# Patient Record
Sex: FEMALE | Race: OTHER | URBAN - METROPOLITAN AREA
[De-identification: names, ages, dates, MRNs, and addresses within clinical notes are randomized per-mention and may not be internally consistent; named-entity substitution may affect disease eponyms.]

---

## 2023-06-09 RX ORDER — PROGESTERONE 100 MG/1
300 CAPSULE ORAL NIGHTLY
COMMUNITY

## 2023-06-16 ENCOUNTER — ANESTHESIA EVENT (OUTPATIENT)
Dept: SURGERY | Facility: HOSPITAL | Age: 33
End: 2023-06-16
Payer: COMMERCIAL

## 2023-06-16 ENCOUNTER — HOSPITAL ENCOUNTER (OUTPATIENT)
Facility: HOSPITAL | Age: 33
Setting detail: HOSPITAL OUTPATIENT SURGERY
Discharge: HOME | End: 2023-06-16
Attending: OBSTETRICS & GYNECOLOGY | Admitting: OBSTETRICS & GYNECOLOGY
Payer: COMMERCIAL

## 2023-06-16 ENCOUNTER — HOSPITAL ENCOUNTER (OUTPATIENT)
Dept: ULTRASOUND IMAGING | Facility: HOSPITAL | Age: 33
Discharge: HOME | End: 2023-06-16
Attending: OBSTETRICS & GYNECOLOGY | Admitting: OBSTETRICS & GYNECOLOGY
Payer: COMMERCIAL

## 2023-06-16 ENCOUNTER — ANESTHESIA (OUTPATIENT)
Dept: SURGERY | Facility: HOSPITAL | Age: 33
End: 2023-06-16
Payer: COMMERCIAL

## 2023-06-16 VITALS
OXYGEN SATURATION: 100 % | DIASTOLIC BLOOD PRESSURE: 95 MMHG | BODY MASS INDEX: 32.44 KG/M2 | HEART RATE: 66 BPM | RESPIRATION RATE: 18 BRPM | HEIGHT: 64 IN | TEMPERATURE: 98 F | WEIGHT: 190 LBS | SYSTOLIC BLOOD PRESSURE: 134 MMHG

## 2023-06-16 DIAGNOSIS — O02.1 EMBRYONIC DEMISE: ICD-10-CM

## 2023-06-16 DIAGNOSIS — O02.89 NONVIABLE PREGNANCY: ICD-10-CM

## 2023-06-16 PROCEDURE — 88305 TISSUE EXAM BY PATHOLOGIST: CPT | Performed by: OBSTETRICS & GYNECOLOGY

## 2023-06-16 PROCEDURE — 88291 CYTO/MOLECULAR REPORT: CPT | Performed by: OBSTETRICS & GYNECOLOGY

## 2023-06-16 PROCEDURE — 88342 IMHCHEM/IMCYTCHM 1ST ANTB: CPT | Performed by: OBSTETRICS & GYNECOLOGY

## 2023-06-16 PROCEDURE — 88271 CYTOGENETICS DNA PROBE: CPT | Performed by: OBSTETRICS & GYNECOLOGY

## 2023-06-16 PROCEDURE — 76998 US GUIDE INTRAOP: CPT | Performed by: OBSTETRICS & GYNECOLOGY

## 2023-06-16 PROCEDURE — 10D17ZZ EXTRACTION OF PRODUCTS OF CONCEPTION, RETAINED, VIA NATURAL OR ARTIFICIAL OPENING: ICD-10-PCS | Performed by: OBSTETRICS & GYNECOLOGY

## 2023-06-16 RX ORDER — MEPERIDINE HYDROCHLORIDE 25 MG/ML
12.5 INJECTION INTRAMUSCULAR; INTRAVENOUS; SUBCUTANEOUS AS NEEDED
Status: DISCONTINUED | OUTPATIENT
Start: 2023-06-16 | End: 2023-06-16

## 2023-06-16 RX ORDER — HYDROCODONE BITARTRATE AND ACETAMINOPHEN 5; 325 MG/1; MG/1
2 TABLET ORAL ONCE AS NEEDED
Status: DISCONTINUED | OUTPATIENT
Start: 2023-06-16 | End: 2023-06-16

## 2023-06-16 RX ORDER — ACETAMINOPHEN 500 MG
1000 TABLET ORAL ONCE AS NEEDED
Status: DISCONTINUED | OUTPATIENT
Start: 2023-06-16 | End: 2023-06-16

## 2023-06-16 RX ORDER — PROCHLORPERAZINE EDISYLATE 5 MG/ML
5 INJECTION INTRAMUSCULAR; INTRAVENOUS EVERY 8 HOURS PRN
Status: DISCONTINUED | OUTPATIENT
Start: 2023-06-16 | End: 2023-06-16

## 2023-06-16 RX ORDER — HYDROMORPHONE HYDROCHLORIDE 1 MG/ML
0.4 INJECTION, SOLUTION INTRAMUSCULAR; INTRAVENOUS; SUBCUTANEOUS EVERY 5 MIN PRN
Status: DISCONTINUED | OUTPATIENT
Start: 2023-06-16 | End: 2023-06-16

## 2023-06-16 RX ORDER — ACETAMINOPHEN 500 MG
1000 TABLET ORAL ONCE
Status: DISCONTINUED | OUTPATIENT
Start: 2023-06-16 | End: 2023-06-16 | Stop reason: HOSPADM

## 2023-06-16 RX ORDER — KETOROLAC TROMETHAMINE 30 MG/ML
INJECTION, SOLUTION INTRAMUSCULAR; INTRAVENOUS AS NEEDED
Status: DISCONTINUED | OUTPATIENT
Start: 2023-06-16 | End: 2023-06-16 | Stop reason: SURG

## 2023-06-16 RX ORDER — SODIUM CHLORIDE, SODIUM LACTATE, POTASSIUM CHLORIDE, CALCIUM CHLORIDE 600; 310; 30; 20 MG/100ML; MG/100ML; MG/100ML; MG/100ML
INJECTION, SOLUTION INTRAVENOUS CONTINUOUS
Status: DISCONTINUED | OUTPATIENT
Start: 2023-06-16 | End: 2023-06-16

## 2023-06-16 RX ORDER — LIDOCAINE HYDROCHLORIDE 10 MG/ML
INJECTION, SOLUTION EPIDURAL; INFILTRATION; INTRACAUDAL; PERINEURAL AS NEEDED
Status: DISCONTINUED | OUTPATIENT
Start: 2023-06-16 | End: 2023-06-16 | Stop reason: SURG

## 2023-06-16 RX ORDER — HYDROMORPHONE HYDROCHLORIDE 1 MG/ML
0.6 INJECTION, SOLUTION INTRAMUSCULAR; INTRAVENOUS; SUBCUTANEOUS EVERY 5 MIN PRN
Status: DISCONTINUED | OUTPATIENT
Start: 2023-06-16 | End: 2023-06-16

## 2023-06-16 RX ORDER — LABETALOL HYDROCHLORIDE 5 MG/ML
5 INJECTION, SOLUTION INTRAVENOUS EVERY 5 MIN PRN
Status: DISCONTINUED | OUTPATIENT
Start: 2023-06-16 | End: 2023-06-16

## 2023-06-16 RX ORDER — MIDAZOLAM HYDROCHLORIDE 1 MG/ML
1 INJECTION INTRAMUSCULAR; INTRAVENOUS EVERY 5 MIN PRN
Status: DISCONTINUED | OUTPATIENT
Start: 2023-06-16 | End: 2023-06-16

## 2023-06-16 RX ORDER — MIDAZOLAM HYDROCHLORIDE 1 MG/ML
INJECTION INTRAMUSCULAR; INTRAVENOUS AS NEEDED
Status: DISCONTINUED | OUTPATIENT
Start: 2023-06-16 | End: 2023-06-16 | Stop reason: SURG

## 2023-06-16 RX ORDER — SCOLOPAMINE TRANSDERMAL SYSTEM 1 MG/1
1 PATCH, EXTENDED RELEASE TRANSDERMAL ONCE
Status: DISCONTINUED | OUTPATIENT
Start: 2023-06-16 | End: 2023-06-16 | Stop reason: HOSPADM

## 2023-06-16 RX ORDER — HYDROMORPHONE HYDROCHLORIDE 1 MG/ML
0.2 INJECTION, SOLUTION INTRAMUSCULAR; INTRAVENOUS; SUBCUTANEOUS EVERY 5 MIN PRN
Status: DISCONTINUED | OUTPATIENT
Start: 2023-06-16 | End: 2023-06-16

## 2023-06-16 RX ORDER — ONDANSETRON 2 MG/ML
4 INJECTION INTRAMUSCULAR; INTRAVENOUS EVERY 6 HOURS PRN
Status: DISCONTINUED | OUTPATIENT
Start: 2023-06-16 | End: 2023-06-16

## 2023-06-16 RX ORDER — HYDROCODONE BITARTRATE AND ACETAMINOPHEN 5; 325 MG/1; MG/1
1 TABLET ORAL ONCE AS NEEDED
Status: DISCONTINUED | OUTPATIENT
Start: 2023-06-16 | End: 2023-06-16

## 2023-06-16 RX ORDER — NALOXONE HYDROCHLORIDE 0.4 MG/ML
80 INJECTION, SOLUTION INTRAMUSCULAR; INTRAVENOUS; SUBCUTANEOUS AS NEEDED
Status: DISCONTINUED | OUTPATIENT
Start: 2023-06-16 | End: 2023-06-16

## 2023-06-16 RX ORDER — SODIUM CHLORIDE 9 MG/ML
INJECTION, SOLUTION INTRAVENOUS CONTINUOUS
Status: DISCONTINUED | OUTPATIENT
Start: 2023-06-16 | End: 2023-06-16

## 2023-06-16 RX ADMIN — MIDAZOLAM HYDROCHLORIDE 2 MG: 1 INJECTION INTRAMUSCULAR; INTRAVENOUS at 07:15:00

## 2023-06-16 RX ADMIN — KETOROLAC TROMETHAMINE 30 MG: 30 INJECTION, SOLUTION INTRAMUSCULAR; INTRAVENOUS at 07:47:00

## 2023-06-16 RX ADMIN — SODIUM CHLORIDE, SODIUM LACTATE, POTASSIUM CHLORIDE, CALCIUM CHLORIDE: 600; 310; 30; 20 INJECTION, SOLUTION INTRAVENOUS at 07:50:00

## 2023-06-16 RX ADMIN — LIDOCAINE HYDROCHLORIDE 50 MG: 10 INJECTION, SOLUTION EPIDURAL; INFILTRATION; INTRACAUDAL; PERINEURAL at 07:17:00

## 2023-06-16 RX ADMIN — SODIUM CHLORIDE, SODIUM LACTATE, POTASSIUM CHLORIDE, CALCIUM CHLORIDE: 600; 310; 30; 20 INJECTION, SOLUTION INTRAVENOUS at 07:15:00

## 2023-06-16 NOTE — H&P
[facility]    PATIENT'S NAME: David Morgan   ATTENDING PHYSICIAN: Elizabeth Zurita M.D. PATIENT ACCOUNT#:   [de-identified]    LOCATION:  [address] [place] [address]  MEDICAL RECORD #:   [id number]       YOB: 1990  ADMISSION DATE:       06/16/2023    HISTORY AND PHYSICAL EXAMINATION    CHIEF COMPLAINT:  Patient presenting for a suction D and C under ultrasound guidance. HISTORY OF PRESENT ILLNESS:  Briefly, the patient is a 29-year-old patient who came to see us in May for new pregnancy visit. She had called with the complaint of spotting. She an ultrasound performed which showed a gestational sac in the fundal aspect and approximately 5 weeks and 2 days at her initial visit on May 25, 2023, at which point she had labs drawn and was encouraged to return in 11 days for followup repeat ultrasound. The patient was A negative. Her hCG at that point was 4533. The patient was given RhoGAM injection, and she had a repeat ultrasound performed on June 5, which showed 2 sac-like structures in the endometrium measuring 1 x 0.79 x 0.96 and 1 x 0.76 x 0.99. No yolk sac or fetal pole was seen. The endometrium appeared thick with some cystic areas suspicious for molar pregnancy. The hCG at that time on June 5 was 13,596 and her progesterone level was 6.20. At this point, we discussed about the abnormal pregnancy given the abnormal ultrasound and the hCG being increasing and not seeing normal pregnancy signs on ultrasound. The patient came back on June 9 and saw me, at which time we discussed about positive molar pregnancy, and she wanted to get some information and wanted to follow up. She did come back on June 14 and had a repeat ultrasound which confirmed still the same findings of thickened heterogeneous hypervascular endometrial lining measuring 44.4 mm, multiple small cystic areas with the 2 prominent cystic areas were seen. Findings suspicious for possible molar pregnancy. Given this finding, the plan was to proceed with a suction D and C under ultrasound guidance. They were explained about the possible concerns for molar pregnancy and the need for suction D and C to confirm the diagnosis. The pros and cons of medication and surgical management were all discussed, and they wanted to proceed with the surgery. Her repeat hCG on June 9 was 15,358. GYNECOLOGIC HISTORY:  Menarche at the age of 8. Periods regular. She denies any history of abnormal Pap smear. Her last Pap smear was in 11/2022, which was normal.    OBSTETRICAL HISTORY:  This is the first pregnancy. PAST MEDICAL HISTORY:  No major medical illness. PAST SURGICAL HISTORY:  No surgeries in the past.    MEDICATIONS:  She is on prenatal vitamin and received RhoGAM 300 mcg. ALLERGIES:  She denies any drug allergies and denies latex allergy. SOCIAL HISTORY:  She denies smoking, alcohol, or recreational drug use. REVIEW OF SYSTEMS:  Negative except as in the HPI. PHYSICAL EXAMINATION:  VITAL SIGNS:  Her blood pressure 118/64. Weight 188 pounds. HEENT:  Grossly normal.  NECK:  Supple. LUNGS:  Clear to auscultation bilaterally. HEART:  S1, S2 heard. ABDOMEN:  Soft, nontender, nondistended. PELVIC:  Deferred. ASSESSMENT:  A 29year-old, G1, P0, at supposed to be 8 weeks and 3 days based on her LMP, but suspicious for molar pregnancy versus nonviable pregnancy. PLAN:  To proceed with a suction D and C under ultrasound guidance. She has been explained the surgical risks, benefits, and complications including, but not limited to, infection, bleeding, damage to surrounding structures, and the consent has been obtained. She is A negative but already has received RhoGAM.  We will hold off on the RhoGAM at this point. I have discussed with the patient and  regarding followup of the hCG if it does turn out to be molar pregnancy.   All their questions have been answered regarding the surgery and postop care.     Dictated By Tabby Sanchez M.D.  d: 06/15/2023 20:03:06  t: 06/15/2023 20:18:17  [facility] [id number]  WT/

## 2023-06-16 NOTE — OPERATIVE REPORT
[facility]  Operative Report    Patient: Josué Oakes  YOB: 1990  MRN: [id number]    Procedure: Suction dilation and curettage under US guidance. Date of procedure: 06/16/23    Pre op diagnosis: Early non viable pregnancy  Suspected Molar pregnancy    Post op diagnosis: Same as above    Indications: Briefly Mrs Gwen Tavarez is a 28 yo with abnormal pregnancy based on HCG and US findings and suspicious for molar pregnancy base on US findings. Hence the plan is to proceed with suction D &C under US guidance. Surgeon: José Miguel Mcpherson MD     Findings:   1. Normal external genitalia  2. Cervix dilated to 9 mm     Anesthesia: General with MAC    EBL: 50 ml with POC's    Fluids: Per anaesthesia    Urine output: 25 cc    Specimen: products of conception    Procedure details: The patient was seen in the preop area where the procedure, risks, benefits and alternatives were discussed and all questions answered. The patient was transferred to the operating room where general MAC anesthesia was initiated. US was performed by the Katiuska, in the room confirming the findings from prior US. The patient was prepped and draped in the normal sterile fashion in dorsal lithotomy position. A catheter was used to empty the bladder and  25  mL of clear urine was obtained. A sterile weighted speculum was placed in the vagina, and a retractor was used to visualize the cervix. A tenaculum clamp was placed on the anterior lip of the cervix. Christian dilators were used to dilate to 8 mm curved suction curette. A suction curette was advanced and rotated in a circular fashion, obtaining a moderate amount of tissue to be sent to pathology. The curette was passed four times. This was all performed under US guidance. Gentle sharp curettage was then performed until a gritty texture was noted with additional tissue to be sent to pathology.  The suction curette was then passed one final time to clear the uterus of any remaining products of conception. The tenaculum clamp was removed from the anterior lip of the cervix. Excellent hemostasis was noted. The weighted speculum was removed. All sponge, lap and instrument counts were correct x2. The patient tolerated the procedure well and was taken to the recovery room in stable condition. The whole procedure was done under US guidance. She has already received Rhogam in the office with in last 10 days an also she received antibiotics during the surgery. Condition: doing well without problems    Disposition: awakened from anesthesia, extubated and taken to the recovery room in a stable condition, having suffered no apparent untoward event.     Dona Ferguson MD

## 2023-06-16 NOTE — ANESTHESIA POSTPROCEDURE EVALUATION
[address] [place] Patient Status:  Hospital Outpatient Surgery   Age/Gender 29year old female MRN [id number]   Location [address] [place] [address] Attending [address], Liz Yoo Moses 647 Day # 0 PCP Celina Alvarado MD       Anesthesia Post-op Note    SUCTION DILATION AND CURETTAGE  WITH ULTRASOUND GUIDANCE    Procedure Summary     Date: 06/16/23 Room / Location: [facility] MAIN OR 19 / [facility] MAIN OR    Anesthesia Start: 9969 Anesthesia Stop:     Procedure: SUCTION DILATION AND CURETTAGE  WITH ULTRASOUND GUIDANCE (Vagina ) Diagnosis: (Suspect Molar Pregnancy, Nonviable pregnancy )    Surgeons: Nu Mirza MD Anesthesiologist: Van Bhatti MD    Anesthesia Type: MAC ASA Status: 1          Anesthesia Type: MAC    Vitals Value Taken Time   /86 06/16/23 0815   Temp 99.5 06/16/23 0824   Pulse 84 06/16/23 0824   Resp 16 06/16/23 0824   SpO2 100 % 06/16/23 0824   Vitals shown include unvalidated device data. Patient Location: [facility] Day Surgery    Anesthesia Type: MAC    Airway Patency: patent    Postop Pain Control: adequate    Mental Status: preanesthetic baseline    Nausea/Vomiting: none    Cardiopulmonary/Hydration status: stable euvolemic    Complications: no apparent anesthesia related complications    Postop vital signs: stable    Dental Exam: Unchanged from Preop    Patient to be discharged home when criteria met.

## 2023-06-16 NOTE — OR NURSING
Dr Jose Stauffer notified regarding A Negative RH factor and related to nurse that patient had received Rogam in her office

## 2023-06-16 NOTE — DISCHARGE INSTRUCTIONS
Home Care Instructions  DILATATION & CURETTAGE (D AND C}    You received a drug called Toradol which is an Anti Inflammatory at:  07:45  AM  If you are allowed to take Anti inflammatories:07    Do not take any Anti Inflammatory like Motrin, Aleve or Ibuprophen until after:1:47  PM  Please report any suspected allergic reactions or bleeding issues to your doctor      1. Take it easy for the first 24 hours. Stay at home if possible and make sure  someone is at home to help you or to report any symptoms or problems you  might have. 2. You may feel weak, dizzy or a little lightheaded from the anesthesia during the  first 24 hours. If so, stay in bed and rest and do not climb up and down the  stairs. If you cannot completely avoid stairs, make sure you have assistance. 3. You may have some spotting or discharge for the next few days, sometimes as  long as a couple of weeks. It is normal to pass an occasional blood clot,  however, continued passage of numerous large blood clots should be reported  Immediately. 4. Take your temperature once a day for the next 7 days and report to us if your  temperature exceeds 100.4 degrees. 5. You may have some cramping and lower abdominal pain for the first 24 hours. Usually two Advil every 4 -6 hours takes care of this problem. If the pain persists  or gets worse, notify the office immediately. 6. Avoid douching, tampons and intercourse for 10 days. 7. Please call in advance and make your appointment for a post-operative check-up  at the office in 7-10 days. 8. Please do not hesitate to call if you have any problems or questions.       Adrian  [phone]

## 2023-06-16 NOTE — INTERVAL H&P NOTE
Pre-op Diagnosis: EARLY EMBRYONIC DEMISE    The above referenced H&P was reviewed by Jesus Gallego MD on 6/16/2023, the patient was examined and no significant changes have occurred in the patient's condition since the H&P was performed. I discussed with the patient and/or legal representative the potential benefits, risks and side effects of this procedure; the likelihood of the patient achieving goals; and potential problems that might occur during recuperation. I discussed reasonable alternatives to the procedure, including risks, benefits and side effects related to the alternatives and risks related to not receiving this procedure. We will proceed with procedure as planned.

## 2023-07-06 ENCOUNTER — APPOINTMENT (OUTPATIENT)
Dept: CT IMAGING | Facility: HOSPITAL | Age: 33
End: 2023-07-06
Attending: EMERGENCY MEDICINE
Payer: COMMERCIAL

## 2023-07-06 ENCOUNTER — HOSPITAL ENCOUNTER (EMERGENCY)
Facility: HOSPITAL | Age: 33
Discharge: HOME | End: 2023-07-06
Attending: EMERGENCY MEDICINE
Payer: COMMERCIAL

## 2023-07-06 VITALS
DIASTOLIC BLOOD PRESSURE: 78 MMHG | HEART RATE: 69 BPM | OXYGEN SATURATION: 100 % | HEIGHT: 64 IN | RESPIRATION RATE: 18 BRPM | TEMPERATURE: 98 F | SYSTOLIC BLOOD PRESSURE: 117 MMHG | BODY MASS INDEX: 31.58 KG/M2 | WEIGHT: 185 LBS

## 2023-07-06 DIAGNOSIS — R51.9 ACUTE NONINTRACTABLE HEADACHE, UNSPECIFIED HEADACHE TYPE: Primary | ICD-10-CM

## 2023-07-06 LAB
ALBUMIN SERPL-MCNC: 3.9 G/DL (ref 3.4–5)
ALBUMIN/GLOB SERPL: 0.9 {RATIO} (ref 1–2)
ALP LIVER SERPL-CCNC: 55 U/L
ALT SERPL-CCNC: 19 U/L
ANION GAP SERPL CALC-SCNC: 5 MMOL/L (ref 0–18)
AST SERPL-CCNC: 15 U/L (ref 15–37)
B-HCG SERPL-ACNC: 2 MIU/ML
B-HCG UR QL: NEGATIVE
BASOPHILS # BLD AUTO: 0.05 X10(3) UL (ref 0–0.2)
BASOPHILS NFR BLD AUTO: 0.6 %
BILIRUB SERPL-MCNC: 0.2 MG/DL (ref 0.1–2)
BUN BLD-MCNC: 7 MG/DL (ref 7–18)
CALCIUM BLD-MCNC: 9.4 MG/DL (ref 8.5–10.1)
CHLORIDE SERPL-SCNC: 107 MMOL/L (ref 98–112)
CO2 SERPL-SCNC: 26 MMOL/L (ref 21–32)
CREAT BLD-MCNC: 0.54 MG/DL
EOSINOPHIL # BLD AUTO: 0.08 X10(3) UL (ref 0–0.7)
EOSINOPHIL NFR BLD AUTO: 1 %
ERYTHROCYTE [DISTWIDTH] IN BLOOD BY AUTOMATED COUNT: 13.9 %
GFR SERPLBLD BASED ON 1.73 SQ M-ARVRAT: 127 ML/MIN/1.73M2 (ref 60–?)
GLOBULIN PLAS-MCNC: 4.5 G/DL (ref 2.8–4.4)
GLUCOSE BLD-MCNC: 90 MG/DL (ref 70–99)
HCT VFR BLD AUTO: 41 %
HGB BLD-MCNC: 12.8 G/DL
IMM GRANULOCYTES # BLD AUTO: 0.05 X10(3) UL (ref 0–1)
IMM GRANULOCYTES NFR BLD: 0.6 %
LYMPHOCYTES # BLD AUTO: 3.24 X10(3) UL (ref 1–4)
LYMPHOCYTES NFR BLD AUTO: 39.1 %
MCH RBC QN AUTO: 24.3 PG (ref 26–34)
MCHC RBC AUTO-ENTMCNC: 31.2 G/DL (ref 31–37)
MCV RBC AUTO: 77.8 FL
MONOCYTES # BLD AUTO: 0.49 X10(3) UL (ref 0.1–1)
MONOCYTES NFR BLD AUTO: 5.9 %
NEUTROPHILS # BLD AUTO: 4.38 X10 (3) UL (ref 1.5–7.7)
NEUTROPHILS # BLD AUTO: 4.38 X10(3) UL (ref 1.5–7.7)
NEUTROPHILS NFR BLD AUTO: 52.8 %
OSMOLALITY SERPL CALC.SUM OF ELEC: 284 MOSM/KG (ref 275–295)
PLATELET # BLD AUTO: 320 10(3)UL (ref 150–450)
POTASSIUM SERPL-SCNC: 3.9 MMOL/L (ref 3.5–5.1)
PROT SERPL-MCNC: 8.4 G/DL (ref 6.4–8.2)
RBC # BLD AUTO: 5.27 X10(6)UL
SODIUM SERPL-SCNC: 138 MMOL/L (ref 136–145)
WBC # BLD AUTO: 8.3 X10(3) UL (ref 4–11)

## 2023-07-06 PROCEDURE — 96374 THER/PROPH/DIAG INJ IV PUSH: CPT

## 2023-07-06 PROCEDURE — 80053 COMPREHEN METABOLIC PANEL: CPT | Performed by: EMERGENCY MEDICINE

## 2023-07-06 PROCEDURE — 99285 EMERGENCY DEPT VISIT HI MDM: CPT

## 2023-07-06 PROCEDURE — 96375 TX/PRO/DX INJ NEW DRUG ADDON: CPT

## 2023-07-06 PROCEDURE — 70450 CT HEAD/BRAIN W/O DYE: CPT | Performed by: EMERGENCY MEDICINE

## 2023-07-06 PROCEDURE — 81025 URINE PREGNANCY TEST: CPT

## 2023-07-06 PROCEDURE — 84702 CHORIONIC GONADOTROPIN TEST: CPT | Performed by: EMERGENCY MEDICINE

## 2023-07-06 PROCEDURE — 85025 COMPLETE CBC W/AUTO DIFF WBC: CPT | Performed by: EMERGENCY MEDICINE

## 2023-07-06 PROCEDURE — 99284 EMERGENCY DEPT VISIT MOD MDM: CPT

## 2023-07-06 RX ORDER — DIPHENHYDRAMINE HYDROCHLORIDE 50 MG/ML
25 INJECTION INTRAMUSCULAR; INTRAVENOUS ONCE
Status: COMPLETED | OUTPATIENT
Start: 2023-07-06 | End: 2023-07-06

## 2023-07-06 RX ORDER — KETOROLAC TROMETHAMINE 30 MG/ML
30 INJECTION, SOLUTION INTRAMUSCULAR; INTRAVENOUS ONCE
Status: COMPLETED | OUTPATIENT
Start: 2023-07-06 | End: 2023-07-06

## 2023-07-06 RX ORDER — METOCLOPRAMIDE HYDROCHLORIDE 5 MG/ML
10 INJECTION INTRAMUSCULAR; INTRAVENOUS ONCE
Status: COMPLETED | OUTPATIENT
Start: 2023-07-06 | End: 2023-07-06

## 2023-07-06 NOTE — ED INITIAL ASSESSMENT (HPI)
Patient presents to the ED with c/o right sided headache since Sunday. Patient states that she has been taking OTC medications with no relief. Patient also states that her blood pressures were high at home, 144/102.

## 2023-07-06 NOTE — ED QUICK NOTES
Assuming care of patient, received report from [facility]. Plan of Care reviewed. Waiting for CT. Bed is locked and in lowest position. Call light within reach.

## 2023-07-07 ENCOUNTER — OFFICE VISIT (OUTPATIENT)
Dept: NEUROLOGY | Facility: CLINIC | Age: 33
End: 2023-07-07
Payer: COMMERCIAL

## 2023-07-07 VITALS
RESPIRATION RATE: 16 BRPM | HEART RATE: 86 BPM | DIASTOLIC BLOOD PRESSURE: 70 MMHG | BODY MASS INDEX: 32 KG/M2 | WEIGHT: 189 LBS | SYSTOLIC BLOOD PRESSURE: 130 MMHG

## 2023-07-07 DIAGNOSIS — G44.52 NPDH (NEW PERSISTENT DAILY HEADACHE): Primary | ICD-10-CM

## 2023-07-07 PROCEDURE — 99204 OFFICE O/P NEW MOD 45 MIN: CPT | Performed by: OTHER

## 2023-07-07 PROCEDURE — 3075F SYST BP GE 130 - 139MM HG: CPT | Performed by: OTHER

## 2023-07-07 PROCEDURE — 3078F DIAST BP <80 MM HG: CPT | Performed by: OTHER

## 2023-07-07 RX ORDER — METHYLPREDNISOLONE 4 MG/1
TABLET ORAL
Qty: 1 TABLET | Refills: 0 | Status: SHIPPED | OUTPATIENT
Start: 2023-07-07

## 2023-07-07 NOTE — PROGRESS NOTES
Patient states severe HA started on 07/02/2023. Patient was seen in the ED on 07/06/2023. Patient states HA are on the right side. Patient denies vision changes. Patient states pain is also located in between the eyes. Patient is no longer having dizziness. Patient denies changes in speech or balance and memory. Denies nausea or vomiting.

## 2024-05-09 LAB
ANTIBODY SCREEN OB: NEGATIVE
HEPATITIS B SURFACE ANTIGEN OB: NEGATIVE
HIV RESULT OB: NEGATIVE
RAPID PLASMA REAGIN OB: NONREACTIVE
RH FACTOR OB: NEGATIVE

## 2024-10-02 LAB
HIV RESULT OB: NEGATIVE
RAPID PLASMA REAGIN OB: NONREACTIVE

## 2024-11-26 LAB — STREP GP B CULT OB: NEGATIVE

## 2024-12-18 ENCOUNTER — TELEPHONE (OUTPATIENT)
Dept: OBGYN UNIT | Facility: HOSPITAL | Age: 34
End: 2024-12-18

## 2024-12-19 ENCOUNTER — HOSPITAL ENCOUNTER (INPATIENT)
Facility: HOSPITAL | Age: 34
LOS: 2 days | Discharge: HOME | End: 2024-12-21
Attending: STUDENT IN AN ORGANIZED HEALTH CARE EDUCATION/TRAINING PROGRAM | Admitting: STUDENT IN AN ORGANIZED HEALTH CARE EDUCATION/TRAINING PROGRAM
Payer: COMMERCIAL

## 2024-12-19 ENCOUNTER — ANESTHESIA (OUTPATIENT)
Dept: OBGYN UNIT | Facility: HOSPITAL | Age: 34
End: 2024-12-19
Payer: COMMERCIAL

## 2024-12-19 ENCOUNTER — ANESTHESIA EVENT (OUTPATIENT)
Dept: OBGYN UNIT | Facility: HOSPITAL | Age: 34
End: 2024-12-19
Payer: COMMERCIAL

## 2024-12-19 PROBLEM — Z34.90 PREGNANCY (HCC): Status: ACTIVE | Noted: 2024-12-19

## 2024-12-19 LAB
ANTIBODY SCREEN: NEGATIVE
BASOPHILS # BLD AUTO: 0.06 X10(3) UL (ref 0–0.2)
BASOPHILS NFR BLD AUTO: 0.5 %
EOSINOPHIL # BLD AUTO: 0.05 X10(3) UL (ref 0–0.7)
EOSINOPHIL NFR BLD AUTO: 0.4 %
ERYTHROCYTE [DISTWIDTH] IN BLOOD BY AUTOMATED COUNT: 15.9 %
HCT VFR BLD AUTO: 39.7 %
HGB BLD-MCNC: 13.2 G/DL
IMM GRANULOCYTES # BLD AUTO: 0.26 X10(3) UL (ref 0–1)
IMM GRANULOCYTES NFR BLD: 2 %
LYMPHOCYTES # BLD AUTO: 3.58 X10(3) UL (ref 1–4)
LYMPHOCYTES NFR BLD AUTO: 27.2 %
MCH RBC QN AUTO: 27 PG (ref 26–34)
MCHC RBC AUTO-ENTMCNC: 33.2 G/DL (ref 31–37)
MCV RBC AUTO: 81.4 FL
MONOCYTES # BLD AUTO: 0.8 X10(3) UL (ref 0.1–1)
MONOCYTES NFR BLD AUTO: 6.1 %
NEUTROPHILS # BLD AUTO: 8.42 X10 (3) UL (ref 1.5–7.7)
NEUTROPHILS # BLD AUTO: 8.42 X10(3) UL (ref 1.5–7.7)
NEUTROPHILS NFR BLD AUTO: 63.8 %
PLATELET # BLD AUTO: 243 10(3)UL (ref 150–450)
RBC # BLD AUTO: 4.88 X10(6)UL
RH BLOOD TYPE: NEGATIVE
RH BLOOD TYPE: NEGATIVE
T PALLIDUM AB SER QL IA: NONREACTIVE
WBC # BLD AUTO: 13.2 X10(3) UL (ref 4–11)

## 2024-12-19 PROCEDURE — 85461 HEMOGLOBIN FETAL: CPT | Performed by: STUDENT IN AN ORGANIZED HEALTH CARE EDUCATION/TRAINING PROGRAM

## 2024-12-19 PROCEDURE — 86780 TREPONEMA PALLIDUM: CPT | Performed by: STUDENT IN AN ORGANIZED HEALTH CARE EDUCATION/TRAINING PROGRAM

## 2024-12-19 PROCEDURE — 85025 COMPLETE CBC W/AUTO DIFF WBC: CPT | Performed by: STUDENT IN AN ORGANIZED HEALTH CARE EDUCATION/TRAINING PROGRAM

## 2024-12-19 PROCEDURE — 0KQM0ZZ REPAIR PERINEUM MUSCLE, OPEN APPROACH: ICD-10-PCS | Performed by: STUDENT IN AN ORGANIZED HEALTH CARE EDUCATION/TRAINING PROGRAM

## 2024-12-19 PROCEDURE — 86850 RBC ANTIBODY SCREEN: CPT | Performed by: STUDENT IN AN ORGANIZED HEALTH CARE EDUCATION/TRAINING PROGRAM

## 2024-12-19 PROCEDURE — 86901 BLOOD TYPING SEROLOGIC RH(D): CPT | Performed by: STUDENT IN AN ORGANIZED HEALTH CARE EDUCATION/TRAINING PROGRAM

## 2024-12-19 PROCEDURE — 85460 HEMOGLOBIN FETAL: CPT | Performed by: STUDENT IN AN ORGANIZED HEALTH CARE EDUCATION/TRAINING PROGRAM

## 2024-12-19 PROCEDURE — 10907ZC DRAINAGE OF AMNIOTIC FLUID, THERAPEUTIC FROM PRODUCTS OF CONCEPTION, VIA NATURAL OR ARTIFICIAL OPENING: ICD-10-PCS | Performed by: STUDENT IN AN ORGANIZED HEALTH CARE EDUCATION/TRAINING PROGRAM

## 2024-12-19 PROCEDURE — 99214 OFFICE O/P EST MOD 30 MIN: CPT

## 2024-12-19 PROCEDURE — 86900 BLOOD TYPING SEROLOGIC ABO: CPT | Performed by: STUDENT IN AN ORGANIZED HEALTH CARE EDUCATION/TRAINING PROGRAM

## 2024-12-19 RX ORDER — ACETAMINOPHEN 500 MG
500 TABLET ORAL EVERY 6 HOURS PRN
Status: DISCONTINUED | OUTPATIENT
Start: 2024-12-19 | End: 2024-12-21

## 2024-12-19 RX ORDER — METOCLOPRAMIDE HYDROCHLORIDE 5 MG/ML
10 INJECTION INTRAMUSCULAR; INTRAVENOUS EVERY 6 HOURS PRN
Status: DISCONTINUED | OUTPATIENT
Start: 2024-12-19 | End: 2024-12-20

## 2024-12-19 RX ORDER — LEVOTHYROXINE SODIUM 25 UG/1
25 TABLET ORAL
COMMUNITY

## 2024-12-19 RX ORDER — TERBUTALINE SULFATE 1 MG/ML
0.25 INJECTION, SOLUTION SUBCUTANEOUS AS NEEDED
Status: DISCONTINUED | OUTPATIENT
Start: 2024-12-19 | End: 2024-12-20

## 2024-12-19 RX ORDER — SODIUM CHLORIDE 9 MG/ML
10 INJECTION, SOLUTION INTRAMUSCULAR; INTRAVENOUS; SUBCUTANEOUS AS NEEDED
Status: DISCONTINUED | OUTPATIENT
Start: 2024-12-19 | End: 2024-12-20

## 2024-12-19 RX ORDER — AMMONIA INHALANTS 0.04 G/.3ML
0.3 INHALANT RESPIRATORY (INHALATION) AS NEEDED
Status: DISCONTINUED | OUTPATIENT
Start: 2024-12-19 | End: 2024-12-21

## 2024-12-19 RX ORDER — IBUPROFEN 600 MG/1
600 TABLET, FILM COATED ORAL ONCE AS NEEDED
Status: COMPLETED | OUTPATIENT
Start: 2024-12-19 | End: 2024-12-19

## 2024-12-19 RX ORDER — ACETAMINOPHEN 500 MG
1000 TABLET ORAL EVERY 6 HOURS PRN
Status: DISCONTINUED | OUTPATIENT
Start: 2024-12-19 | End: 2024-12-19

## 2024-12-19 RX ORDER — ACETAMINOPHEN 500 MG
1000 TABLET ORAL EVERY 6 HOURS PRN
Status: DISCONTINUED | OUTPATIENT
Start: 2024-12-19 | End: 2024-12-21

## 2024-12-19 RX ORDER — LIDOCAINE HYDROCHLORIDE AND EPINEPHRINE 15; 5 MG/ML; UG/ML
INJECTION, SOLUTION EPIDURAL AS NEEDED
Status: DISCONTINUED | OUTPATIENT
Start: 2024-12-19 | End: 2024-12-19 | Stop reason: SURG

## 2024-12-19 RX ORDER — NALBUPHINE HYDROCHLORIDE 10 MG/ML
2.5 INJECTION INTRAMUSCULAR; INTRAVENOUS; SUBCUTANEOUS
Status: DISCONTINUED | OUTPATIENT
Start: 2024-12-19 | End: 2024-12-20

## 2024-12-19 RX ORDER — ACETAMINOPHEN 500 MG
500 TABLET ORAL EVERY 6 HOURS PRN
Status: DISCONTINUED | OUTPATIENT
Start: 2024-12-19 | End: 2024-12-20

## 2024-12-19 RX ORDER — LEVOTHYROXINE SODIUM 25 UG/1
25 TABLET ORAL
Status: DISCONTINUED | OUTPATIENT
Start: 2024-12-20 | End: 2024-12-21

## 2024-12-19 RX ORDER — BUPIVACAINE HYDROCHLORIDE 2.5 MG/ML
30 INJECTION, SOLUTION EPIDURAL; INFILTRATION; INTRACAUDAL AS NEEDED
Status: DISCONTINUED | OUTPATIENT
Start: 2024-12-19 | End: 2024-12-20

## 2024-12-19 RX ORDER — SODIUM CHLORIDE, SODIUM LACTATE, POTASSIUM CHLORIDE, CALCIUM CHLORIDE 600; 310; 30; 20 MG/100ML; MG/100ML; MG/100ML; MG/100ML
INJECTION, SOLUTION INTRAVENOUS CONTINUOUS
Status: DISCONTINUED | OUTPATIENT
Start: 2024-12-19 | End: 2024-12-20

## 2024-12-19 RX ORDER — BISACODYL 10 MG
10 SUPPOSITORY, RECTAL RECTAL ONCE AS NEEDED
Status: DISCONTINUED | OUTPATIENT
Start: 2024-12-19 | End: 2024-12-21

## 2024-12-19 RX ORDER — LIDOCAINE HYDROCHLORIDE AND EPINEPHRINE 15; 5 MG/ML; UG/ML
5 INJECTION, SOLUTION EPIDURAL AS NEEDED
Status: DISCONTINUED | OUTPATIENT
Start: 2024-12-19 | End: 2024-12-20

## 2024-12-19 RX ORDER — ONDANSETRON 2 MG/ML
4 INJECTION INTRAMUSCULAR; INTRAVENOUS EVERY 6 HOURS PRN
Status: DISCONTINUED | OUTPATIENT
Start: 2024-12-19 | End: 2024-12-20

## 2024-12-19 RX ORDER — PNV NO.95/FERROUS FUM/FOLIC AC 28MG-0.8MG
1 TABLET ORAL DAILY
COMMUNITY

## 2024-12-19 RX ORDER — BUPIVACAINE HCL/0.9 % NACL/PF 0.25 %
5 PLASTIC BAG, INJECTION (ML) EPIDURAL AS NEEDED
Status: DISCONTINUED | OUTPATIENT
Start: 2024-12-19 | End: 2024-12-20

## 2024-12-19 RX ORDER — MULTIVIT-MIN/IRON/FOLIC ACID/K 18-600-40
1 CAPSULE ORAL DAILY
COMMUNITY

## 2024-12-19 RX ORDER — DEXTROSE, SODIUM CHLORIDE, SODIUM LACTATE, POTASSIUM CHLORIDE, AND CALCIUM CHLORIDE 5; .6; .31; .03; .02 G/100ML; G/100ML; G/100ML; G/100ML; G/100ML
INJECTION, SOLUTION INTRAVENOUS AS NEEDED
Status: DISCONTINUED | OUTPATIENT
Start: 2024-12-19 | End: 2024-12-20

## 2024-12-19 RX ORDER — IBUPROFEN 600 MG/1
600 TABLET, FILM COATED ORAL EVERY 6 HOURS
Status: DISCONTINUED | OUTPATIENT
Start: 2024-12-19 | End: 2024-12-21

## 2024-12-19 RX ORDER — LIDOCAINE HYDROCHLORIDE 20 MG/ML
5 INJECTION, SOLUTION EPIDURAL; INFILTRATION; INTRACAUDAL; PERINEURAL AS NEEDED
Status: DISCONTINUED | OUTPATIENT
Start: 2024-12-19 | End: 2024-12-20

## 2024-12-19 RX ORDER — LIDOCAINE HYDROCHLORIDE 10 MG/ML
INJECTION, SOLUTION EPIDURAL; INFILTRATION; INTRACAUDAL; PERINEURAL AS NEEDED
Status: DISCONTINUED | OUTPATIENT
Start: 2024-12-19 | End: 2024-12-19 | Stop reason: SURG

## 2024-12-19 RX ORDER — CALCIUM CARBONATE 500 MG/1
1000 TABLET, CHEWABLE ORAL EVERY 4 HOURS PRN
Status: DISCONTINUED | OUTPATIENT
Start: 2024-12-19 | End: 2024-12-20

## 2024-12-19 RX ORDER — DOCUSATE SODIUM 100 MG/1
100 CAPSULE, LIQUID FILLED ORAL
Status: DISCONTINUED | OUTPATIENT
Start: 2024-12-19 | End: 2024-12-21

## 2024-12-19 RX ORDER — BUPIVACAINE HYDROCHLORIDE 2.5 MG/ML
INJECTION, SOLUTION EPIDURAL; INFILTRATION; INTRACAUDAL AS NEEDED
Status: DISCONTINUED | OUTPATIENT
Start: 2024-12-19 | End: 2024-12-19 | Stop reason: SURG

## 2024-12-19 RX ORDER — CITRIC ACID/SODIUM CITRATE 334-500MG
30 SOLUTION, ORAL ORAL AS NEEDED
Status: DISCONTINUED | OUTPATIENT
Start: 2024-12-19 | End: 2024-12-20

## 2024-12-19 RX ORDER — SIMETHICONE 80 MG
80 TABLET,CHEWABLE ORAL 3 TIMES DAILY PRN
Status: DISCONTINUED | OUTPATIENT
Start: 2024-12-19 | End: 2024-12-21

## 2024-12-19 RX ADMIN — LIDOCAINE HYDROCHLORIDE 5 ML: 10 INJECTION, SOLUTION EPIDURAL; INFILTRATION; INTRACAUDAL; PERINEURAL at 13:00:00

## 2024-12-19 RX ADMIN — LIDOCAINE HYDROCHLORIDE AND EPINEPHRINE 3 ML: 15; 5 INJECTION, SOLUTION EPIDURAL at 13:04:00

## 2024-12-19 RX ADMIN — BUPIVACAINE HYDROCHLORIDE 5 ML: 2.5 INJECTION, SOLUTION EPIDURAL; INFILTRATION; INTRACAUDAL at 13:05:00

## 2024-12-19 NOTE — PROGRESS NOTES
Report given to DEJAH Landeros RN. Cares of pt transferred at this time. Pt taken to 111 per ambulation.

## 2024-12-19 NOTE — PLAN OF CARE
Problem: BIRTH - VAGINAL/ SECTION  Goal: Fetal and maternal status remain reassuring during the birth process  Description: INTERVENTIONS:  - Monitor vital signs  - Monitor fetal heart rate  - Monitor uterine activity  - Monitor labor progression (vaginal delivery)  - DVT prophylaxis (C/S delivery)  - Surgical antibiotic prophylaxis (C/S delivery)  Outcome: Progressing     Problem: PAIN - ADULT  Goal: Verbalizes/displays adequate comfort level or patient's stated pain goal  Description: INTERVENTIONS:  - Encourage pt to monitor pain and request assistance  - Assess pain using appropriate pain scale  - Administer analgesics based on type and severity of pain and evaluate response  - Implement non-pharmacological measures as appropriate and evaluate response  - Consider cultural and social influences on pain and pain management  - Manage/alleviate anxiety  - Utilize distraction and/or relaxation techniques  - Monitor for opioid side effects  - Notify MD/LIP if interventions unsuccessful or patient reports new pain  - Anticipate increased pain with activity and pre-medicate as appropriate  Outcome: Progressing     Problem: ANXIETY  Goal: Will report anxiety at manageable levels  Description: INTERVENTIONS:  - Administer medication as ordered  - Teach and rehearse alternative coping skills  - Provide emotional support with 1:1 interaction with staff  Outcome: Progressing     Problem: Patient/Family Goals  Goal: Patient/Family Long Term Goal  Description: Patient's Long Term Goal: Adequate pain management    Interventions:  - Offer pain meds and epidural as needed  - See additional Care Plan goals for specific interventions  Outcome: Progressing  Goal: Patient/Family Short Term Goal  Description: Patient's Short Term Goal: Have a safe delivery     Interventions:   - Follow policies and procedures. Keep provider informed of pt status  - See additional Care Plan goals for specific interventions  Outcome:  Progressing

## 2024-12-19 NOTE — PROGRESS NOTES
EFMs applied, FHTs 135. Pt states she started sanket at 0600. Pt states ctxs are every 5 min. Pt denies LOF and VB. +FM. Pt denies any problems with pregnancy. Pt has anemia and hypothyroid. Denies any other medical problems. Admission assessment initiated at this time.

## 2024-12-19 NOTE — PROGRESS NOTES
, 39+1 arrives per ambulation. Pt here for ctxs. Pt taken to Triage 3 and changing at this time.

## 2024-12-19 NOTE — H&P
[facility]  Obstetrics Admission History & Physical    Sabina Goyal Patient Status:  Inpatient    1990 MRN [id number]   Location [facility] LABOR & DELIVERY Attending Sarah Morris MD   Hosp Day # 0 PCP Star Cleaning MD       HISTORY OF PRESENT ILLNESS:  The patient is a 34 year old  female at 39w1d Estimated Date of Delivery: 24, admitted for labor. She presented to labor and delivery with painful contractions and cervical dilation of 4cm.  The patient has a history of hypothyroidism on levothyroxine. Increased BMI. She had normal glucose testing and has been taking prophylactic ASA. She is Rh negative. Fetal Rh was positive on Panorama. She received Rhogam. She has been taking iron for anemia throughout the pregnancy. She has uterine fibroids.      PAST MEDICAL HISTORY:   Past Medical History:    Anemia    Fibroids    Hypothyroidism        PAST SURGICAL HISTORY:     Past Surgical History:   Procedure Laterality Date    Other surgical history  2023    D&C        MEDICATIONS:   Prescriptions Prior to Admission[1]     ALLERGIES:   Allergies[2]     SOCIAL HISTORY:     Social History     Tobacco Use    Smoking status: Never    Smokeless tobacco: Never   Substance Use Topics    Alcohol use: Not Currently       FAMILY HISTORY:   Family History   Problem Relation Age of Onset    Diabetes Mother     Hypertension Mother     Hypertension Father     Diabetes Father         GYNECOLOGICAL HISTORY:        Menarche 14 yo. No history of an abnormal Pap smear.  No history of any sexually transmitted diseases.       OBSTETRIC HISTORY:    OB History    Para Term  AB Living   2 0     1 0   SAB IAB Ectopic Multiple Live Births   1       0      # Outcome Date GA Lbr Jean/2nd Weight Sex Type Anes PTL Lv   2 Current            1 SAB 2023 8w0d    SAB           A negative, antibody screen negative  rubella immune  HBsAg: nonreactive  RPR nonreactive  Urine culture  negative  HIV negative  Hemoglobin electrophoresis normal adult pattern  GC/chlamydia negative  Parvo immune  Hepatitis C Ab negative  Genetic screening: low risk Panorama, Horizon 27 negative, NT wnl   AFP negative  1hr  in 1st tri Hgb A1c 5.2%, 119 in 3rd tri  Hemoglobin 12.3 -> 10.6 -> 10.7 -> 10.9    Ferritin 45.4 -> 26 -> 31.9   Antibody screen at 28 weeks negative    HIV at 28 weeks negative    RPR nonreactive at 28 weeks  Group B strep at 36 weeks negative     PHYSICAL EXAMINATION:      VITAL SIGNS:    /65   Pulse 89   Temp 98 °F (36.7 °C) (Oral)   Resp 18   Ht 5' 4\" (1.626 m)   Wt 222 lb (100.7 kg)   LMP 2024   SpO2 100%   BMI 38.11 kg/m²     ABDOMEN:  Term uterus.  Cephalic.  Positive fetal heart tones.       ASSESSMENT:    1. Intrauterine pregnancy, 39w1d.  2. Spontaneous labor  3. Hypothyroidism on levothyroxine  4. Increased BMI with normal glucose testing  5. Rh negative, fetal Rh positive on Panorama, s/p Rhogam  6. History of fibroids  7. Anemia in pregnancy on oral iron     PLAN:  The patient is admitted for labor.  Recommendations discussed.  Risks and benefits discussed.  Routine admission orders.  Anticipate .    Sarah Morris MD  2024         [1]   Medications Prior to Admission   Medication Sig Dispense Refill Last Dose/Taking    levothyroxine 25 MCG Oral Tab Take 1 tablet (25 mcg total) by mouth before breakfast.   2024 at  5:30 AM    Cholecalciferol (VITAMIN D) 50 MCG (2000 UT) Oral Cap Take 1 capsule (2,000 Units total) by mouth daily.   2024    Ferrous Sulfate (IRON) 325 (65 Fe) MG Oral Tab Take 1 tablet by mouth daily.   2024    Prenatal Multivit-Min-Fe-FA (PRENATAL OR) Take by mouth daily.   2024    methylPREDNISolone (MEDROL) 4 MG Oral Tablet Therapy Pack Take as directed 1 tablet 0     progesterone 100 MG Oral Cap Take 3 capsules (300 mg total) by mouth nightly. (Patient not taking: Reported on 2023)      [2] No Known  Allergies

## 2024-12-19 NOTE — ANESTHESIA PROCEDURE NOTES
Labor Analgesia    Date/Time: 12/19/2024 12:50 PM    Performed by: Migel Jones MD  Authorized by: Migel Jones MD      General Information and Staff    Start Time:  12/19/2024 12:50 PM  End Time:  12/19/2024 1:04 PM  Anesthesiologist:  Migel Jones MD  Performed by:  Anesthesiologist  Patient Location:  OB  Site Identification: surface landmarks    Reason for Block: labor epidural    Preanesthetic Checklist: patient identified, IV checked, risks and benefits discussed, monitors and equipment checked, pre-op evaluation, timeout performed, IV bolus, anesthesia consent and sterile technique used      Procedure Details    Patient Position:  Sitting  Prep: ChloraPrep    Monitoring:  Heart rate and continuous pulse ox  Approach:  Midline    Epidural Needle    Injection Technique:   Needle Type:  Tuohy  Needle Gauge:  17 G  Needle Length:  3.375 in  Needle Insertion Depth:  8  Location:  L3-4    Spinal Needle      Catheter    Catheter Type:  End hole  Catheter Size:  19 G  Catheter at Skin Depth:  13  Test Dose:  Negative    Assessment      Additional Comments

## 2024-12-19 NOTE — ANESTHESIA PREPROCEDURE EVALUATION
PRE-OP EVALUATION    Patient Name: Sabina Goyal    Admit Diagnosis: Pregnancy (HCC) [Z34.90]    Pre-op Diagnosis: * No surgery found *        Anesthesia Procedure: LABOR ANALGESIA    * Surgery not found *    Pre-op vitals reviewed.  Temp: 98 °F (36.7 °C)  Pulse: 80  Resp: 18  BP: 120/76  SpO2: 98 %  Body mass index is 38.11 kg/m².    Current medications reviewed.  Hospital Medications:   lactated ringers infusion   Intravenous Continuous    dextrose in lactated ringers 5% infusion   Intravenous PRN    lactated ringers IV bolus 500 mL  500 mL Intravenous PRN    acetaminophen (Tylenol Extra Strength) tab 500 mg  500 mg Oral Q6H PRN    acetaminophen (Tylenol Extra Strength) tab 1,000 mg  1,000 mg Oral Q6H PRN    ibuprofen (Motrin) tab 600 mg  600 mg Oral Once PRN    ondansetron (Zofran) 4 MG/2ML injection 4 mg  4 mg Intravenous Q6H PRN    oxyTOCIN in sodium chloride 0.9% (Pitocin) 30 Units/500mL infusion premix  62.5-900 ramy-units/min Intravenous Continuous    terbutaline (Brethine) 1 MG/ML injection 0.25 mg  0.25 mg Subcutaneous PRN    sodium citrate-citric acid (Bicitra) 500-334 MG/5ML oral solution 30 mL  30 mL Oral PRN    metoclopramide (Reglan) 5 mg/mL injection 10 mg  10 mg Intravenous Q6H PRN    calcium carbonate (Tums) chewable tab 1,000 mg  1,000 mg Oral Q4H PRN    [COMPLETED] lactated ringers IV bolus 1,000 mL  1,000 mL Intravenous Once    fentaNYL-bupivacaine 2 mcg/mL-0.125% in sodium chloride 0.9% 100 mL EPIDURAL infusion premix  12 mL/hr Epidural Continuous    fentaNYL (Sublimaze) 50 mcg/mL injection 100 mcg  100 mcg Epidural Once    lidocaine 1.5%-EPINEPHrine 1:200,000 (Xylocaine-Epinephrine) injection  5 mL Injection PRN    bupivacaine PF (Marcaine) 0.25% injection  30 mL Injection PRN    lidocaine PF (Xylocaine-MPF) 2% injection  5 mL Injection PRN    sodium chloride 0.9% PF injection 10 mL  10 mL Injection PRN    ePHEDrine (PF) 25 MG/5 ML injection 5 mg  5 mg Intravenous PRN    nalbuphine (Nubain)  10 mg/mL injection 2.5 mg  2.5 mg Intravenous Q15 Min PRN       Outpatient Medications:   Prescriptions Prior to Admission[1]    Allergies: Patient has no known allergies.      Anesthesia Evaluation        Anesthetic Complications  (-) history of anesthetic complications         GI/Hepatic/Renal    Negative GI/hepatic/renal ROS.                             Cardiovascular    Negative cardiovascular ROS.    Exercise tolerance: good     MET: >4                                           Endo/Other    Negative endo/other ROS.                 (-) clotting disorder  (-) thrombocytopenia           Pulmonary    Negative pulmonary ROS.                       Neuro/Psych    Negative neuro/psych ROS.                                  Past Surgical History:   Procedure Laterality Date    Other surgical history  06/16/2023    D&C     Social History     Socioeconomic History    Marital status:    Tobacco Use    Smoking status: Never    Smokeless tobacco: Never   Vaping Use    Vaping status: Never Used   Substance and Sexual Activity    Alcohol use: Not Currently    Drug use: Never   Other Topics Concern    Caffeine Concern Yes     Comment: tea occ    Exercise Yes     Comment: occ     History   Drug Use Unknown     Available pre-op labs reviewed.  Lab Results   Component Value Date    WBC 13.2 (H) 12/19/2024    RBC 4.88 12/19/2024    HGB 13.2 12/19/2024    HCT 39.7 12/19/2024    MCV 81.4 12/19/2024    MCH 27.0 12/19/2024    MCHC 33.2 12/19/2024    RDW 15.9 12/19/2024    .0 12/19/2024               Airway      Mallampati: II  Mouth opening: >3 FB  TM distance: > 6 cm  Neck ROM: full Cardiovascular    Cardiovascular exam normal.         Dental    Dentition appears grossly intact         Pulmonary    Pulmonary exam normal.                 Other findings              ASA: 3   Plan: epidural       Post-procedure pain management plan discussed with surgeon and patient.      Plan/risks discussed with: patient and  spouse                Present on Admission:  **None**             [1]   Medications Prior to Admission   Medication Sig Dispense Refill Last Dose/Taking    levothyroxine 25 MCG Oral Tab Take 1 tablet (25 mcg total) by mouth before breakfast.   12/19/2024 at  5:30 AM    Cholecalciferol (VITAMIN D) 50 MCG (2000 UT) Oral Cap Take 1 capsule (2,000 Units total) by mouth daily.   12/18/2024    Ferrous Sulfate (IRON) 325 (65 Fe) MG Oral Tab Take 1 tablet by mouth daily.   12/18/2024    Prenatal Multivit-Min-Fe-FA (PRENATAL OR) Take by mouth daily.   12/18/2024    methylPREDNISolone (MEDROL) 4 MG Oral Tablet Therapy Pack Take as directed 1 tablet 0     progesterone 100 MG Oral Cap Take 3 capsules (300 mg total) by mouth nightly. (Patient not taking: Reported on 7/7/2023)

## 2024-12-20 LAB
BASOPHILS # BLD AUTO: 0.06 X10(3) UL (ref 0–0.2)
BASOPHILS NFR BLD AUTO: 0.3 %
EOSINOPHIL # BLD AUTO: 0.05 X10(3) UL (ref 0–0.7)
EOSINOPHIL NFR BLD AUTO: 0.3 %
ERYTHROCYTE [DISTWIDTH] IN BLOOD BY AUTOMATED COUNT: 16.1 %
FETAL SCREEN RESULT: POSITIVE
HCT VFR BLD AUTO: 34.2 %
HGB BLD-MCNC: 11 G/DL
IMM GRANULOCYTES # BLD AUTO: 0.22 X10(3) UL (ref 0–1)
IMM GRANULOCYTES NFR BLD: 1.2 %
KLEIHAUER BETKE RESULT: NEGATIVE
LYMPHOCYTES # BLD AUTO: 3.14 X10(3) UL (ref 1–4)
LYMPHOCYTES NFR BLD AUTO: 17.6 %
MCH RBC QN AUTO: 26.5 PG (ref 26–34)
MCHC RBC AUTO-ENTMCNC: 32.2 G/DL (ref 31–37)
MCV RBC AUTO: 82.4 FL
MONOCYTES # BLD AUTO: 1.24 X10(3) UL (ref 0.1–1)
MONOCYTES NFR BLD AUTO: 7 %
NEUTROPHILS # BLD AUTO: 13.11 X10 (3) UL (ref 1.5–7.7)
NEUTROPHILS # BLD AUTO: 13.11 X10(3) UL (ref 1.5–7.7)
NEUTROPHILS NFR BLD AUTO: 73.6 %
PLATELET # BLD AUTO: 193 10(3)UL (ref 150–450)
RBC # BLD AUTO: 4.15 X10(6)UL
WBC # BLD AUTO: 17.8 X10(3) UL (ref 4–11)

## 2024-12-20 PROCEDURE — 85025 COMPLETE CBC W/AUTO DIFF WBC: CPT | Performed by: STUDENT IN AN ORGANIZED HEALTH CARE EDUCATION/TRAINING PROGRAM

## 2024-12-20 NOTE — L&D DELIVERY NOTE
Vaginal Delivery Note          Sabina Goyal Patient Status:  Inpatient    1990 MRN [id number]   Former[facility] LABOR & DELIVERY Attending Sarah Morris MD   Hosp Day # 0 PCP Star Cleaning MD       Preprocedure Dx:  IUP at 39w1d, increased BMI, hypothyroidism on Levothyroxine, Rh negative, history of uterine fibroids    Post Procedure Dx: Same - delivered    Procedure: Vacuum Assisted Vaginal Delivery    Physician: Sarah Morris MD    Anesthesia: Epidural    QBL: 468 mL    Findings:   1) A viable male infant with Apgars of 8 and 9 weighing 6 lbs 5.2 oz was delivered in FREDERIC position.   2) 3 vessel cord.   3) Normal appearing placenta spontaneously delivered intact.   4) 2nd degree laceration with bilateral vaginal extensions    Procedure:  Patient is a 35y/o  who presented at 39w1d gestation complaining of painful contractions. She was 4cm dilated. Patient was admitted to labor and delivery. She had intermittent variable decelerations during labor. Epidural was placed per patient request. She progressed to 5cm. AROM was performed at 1359 with meconium stained fluid. Her labor progressed, and upon complete dilation she was encouraged to push. The tracing had recurrent decelerations with reflexive tachycardia and periods of minimal variability. After 2.5 hours of pushing with minimal progress at +2 station discussion was held regarding fetal tracing and expected time for continued pushing. Vacuum delivery risks and benefits discussed. She desired to continue pushing. After 3 hours of pushing the patient decided to proceed with vacuum delivery. The position of the baby was confirmed to be FREDERIC. The vacuum was applied anterior to the posterior fontanelle. The surrounding vaginal tissue was free of the vacuum. The bladder could not be drained due to the fetal head. During contractions the pressure was increased to 500mmHg on the  vacuum, and gentle downward traction was applied. Pressure was released between contractions. One pop off occurred. She pushed with vacuum assistance for 4 contractions.     The patient pushed for approximately 3 hours 22 minutes.  As the head was delivered, the vacuum was removed and theperineum was supported to decrease the risk of tearing.  The infant was delivered in the FREDERIC position. The anterior shoulder delivered easily followed by the posterior shoulder and remainder of the infant. The infant's mouth and nose were bulb suctioned. The cord was doubly clamped and cut after a 61 second delay. The baby was placed on the mother's abdomen.  The cord blood was collected, and the placenta spontaneously delivered intact shortly thereafter. Bimanual exam was performed to the fundus. Retained products were not palpated.    Examination of the cervix, vagina, and perineum demonstrated a 2nd degree perineal laceration with bilateral extensions in the vagina.  The vaginal lacerations were repaired using 2-0 vicryl in a running locked fashion from the apex of the tear sto the level of the hymenal ring.  A deep crown stitch was placed, and the perineal body was re-approximated using 2-0 vicryl in an running fashion. The skin was re-approximated using 2-0 vicryl in a subcuticular fashion. A recto-vaginal exam was normal and bleeding was minimal. The bladder was drained of 300mL clear urine with a catheter. The patient was then moved to the supine position in stable condition.  Counts were correct.    Complications:  None    Sarah Morris MD  12/19/2024  9:49 PM

## 2024-12-20 NOTE — PROGRESS NOTES
Labor Analgesia Follow Up Note    Patient underwent epidural anesthesia for labor analgesia,    Placenta Date/Time: 12/19/2024  9:05 PM    Delivery Date/Time:: 12/19/2024  8:57 PM    /64 (BP Location: Right arm)   Pulse 90   Temp 97.8 °F (36.6 °C) (Oral)   Resp 14   Ht 1.626 m (5' 4\")   Wt 100.7 kg (222 lb)   LMP 03/20/2024   SpO2 100%   Breastfeeding Yes   BMI 38.11 kg/m²     Assessment:  Patient seen and no apparent anesthesia related complications. Pt c/o urinary retention and constipation after prolonged final stage of labor.  Denies N/V/F/C/numbness/weakness. Longer pushing and/or small amt of narcotic in epidural may contribute to sx's which appear to be resolving.    Thank you for asking us to participate in the care of your patient.

## 2024-12-20 NOTE — PLAN OF CARE
Problem: BIRTH - VAGINAL/ SECTION  Goal: Fetal and maternal status remain reassuring during the birth process  Description: INTERVENTIONS:  - Monitor vital signs  - Monitor fetal heart rate  - Monitor uterine activity  - Monitor labor progression (vaginal delivery)  - DVT prophylaxis (C/S delivery)  - Surgical antibiotic prophylaxis (C/S delivery)  Outcome: Completed     Problem: PAIN - ADULT  Goal: Verbalizes/displays adequate comfort level or patient's stated pain goal  Description: INTERVENTIONS:  - Encourage pt to monitor pain and request assistance  - Assess pain using appropriate pain scale  - Administer analgesics based on type and severity of pain and evaluate response  - Implement non-pharmacological measures as appropriate and evaluate response  - Consider cultural and social influences on pain and pain management  - Manage/alleviate anxiety  - Utilize distraction and/or relaxation techniques  - Monitor for opioid side effects  - Notify MD/LIP if interventions unsuccessful or patient reports new pain  - Anticipate increased pain with activity and pre-medicate as appropriate  Outcome: Completed     Problem: ANXIETY  Goal: Will report anxiety at manageable levels  Description: INTERVENTIONS:  - Administer medication as ordered  - Teach and rehearse alternative coping skills  - Provide emotional support with 1:1 interaction with staff  Outcome: Completed     Problem: Patient/Family Goals  Goal: Patient/Family Long Term Goal  Description: Patient's Long Term Goal: Adequate pain management    Interventions:  - Offer pain meds and epidural as needed  - See additional Care Plan goals for specific interventions  Outcome: Completed  Goal: Patient/Family Short Term Goal  Description: Patient's Short Term Goal: Have a safe delivery     Interventions:   - Follow policies and procedures. Keep provider informed of pt status  - See additional Care Plan goals for specific interventions  Outcome: Completed

## 2024-12-20 NOTE — PLAN OF CARE
Admitted to 1115. Id bands verified by 2 Rns. Admission assessment and vitals completed. POC discussed with pt and support person. Oriented to room and call light. All questions answered.    Problem: POSTPARTUM  Goal: Long Term Goal:Experiences normal postpartum course  Description: INTERVENTIONS:  - Assess and monitor vital signs and lab values.  - Assess fundus and lochia.  - Provide ice/sitz baths for perineum discomfort.  - Monitor healing of incision/episiotomy/laceration, and assess for signs and symptoms of infection and hematoma.  - Assess bladder function and monitor for bladder distention.  - Provide/instruct/assist with pericare as needed.  - Provide VTE prophylaxis as needed.  - Monitor bowel function.  - Encourage ambulation and provide assistance as needed.  - Assess and monitor emotional status and provide social service/psych resources as needed.  - Utilize standard precautions and use personal protective equipment as indicated. Ensure aseptic care of all intravenous lines and invasive tubes/drains.  - Obtain immunization and exposure to communicable diseases history.  Outcome: Progressing  Goal: Optimize infant feeding at the breast  Description: INTERVENTIONS:  - Initiate breast feeding within first hour after birth.   - Monitor effectiveness of current breast feeding efforts.  - Assess support systems available to mother/family.  - Identify cultural beliefs/practices regarding lactation, letdown techniques, maternal food preferences.  - Assess mother's knowledge and previous experience with breast feeding.  - Provide information as needed about early infant feeding cues (e.g., rooting, lip smacking, sucking fingers/hand) versus late cue of crying.  - Discuss/demonstrate breast feeding aids (e.g., infant sling, nursing footstool/pillows, and breast pumps).  - Encourage mother/other family members to express feelings/concerns, and actively listen.  - Educate father/SO about benefits of breast  feeding and how to manage common lactation challenges.  - Recommend avoidance of specific medications or substances incompatible with breast feeding.  - Assess and monitor for signs of nipple pain/trauma.  - Instruct and provide assistance with proper latch.  - Review techniques for milk expression (breast pumping) and storage of breast milk. Provide pumping equipment/supplies, instructions and assistance, as needed.  - Encourage rooming-in and breast feeding on demand.  - Encourage skin-to-skin contact.  - Provide LC support as needed.  - Assess for and manage engorgement.  - Provide breast feeding education handouts and information on community breast feeding support.   Outcome: Progressing  Goal: Establishment of adequate milk supply with medication/procedure interruptions  Description: INTERVENTIONS:  - Review techniques for milk expression (breast pumping).   - Provide pumping equipment/supplies, instructions, and assistance until it is safe to breastfeed infant.  Outcome: Progressing  Goal: Experiences normal breast weaning course  Description: INTERVENTIONS:  - Assess for and manage engorgement.  - Instruct on breast care.  - Provide comfort measures.  Outcome: Progressing  Goal: Appropriate maternal -  bonding  Description: INTERVENTIONS:  - Assess caregiver- interactions.  - Assess caregiver's emotional status and coping mechanisms.  - Encourage caregiver to participate in  daily care.  - Assess support systems available to mother/family.  - Provide /case management support as needed.  Outcome: Progressing

## 2024-12-20 NOTE — PROGRESS NOTES
Report received from jean pierre FU MD at bedside and RN at bedside. Pt pushing at this time. Pt in stable condition.

## 2024-12-20 NOTE — PROGRESS NOTES
PPD1    S:Doing well, she has a castellanos catheter in place since this am, lochia moderate, she is breastfeeding and states that pain is well managed  O: /64 (BP Location: Right arm)   Pulse 90   Temp 97.8 °F (36.6 °C) (Oral)   Resp 14   Ht 5' 4\" (1.626 m)   Wt 222 lb (100.7 kg)   LMP 03/20/2024   SpO2 100%   Breastfeeding Yes   BMI 38.11 kg/m²   Recent Labs   Lab 12/19/24  1210 12/20/24  0630   RBC 4.88 4.15   HGB 13.2 11.0*   HCT 39.7 34.2*   MCV 81.4 82.4   MCH 27.0 26.5   MCHC 33.2 32.2   RDW 15.9 16.1   NEPRELIM 8.42* 13.11*   WBC 13.2* 17.8*   .0 193.0       General: alert and oriented x3  Respiratory: non labored breathing  Uterus: firm nt  Extremities: minimal edema, no evidence of dvt      A: s/p VAVD       Anemia       Urinary retention with castellanos catheter in place  P: Will remove catheter now and see if she is able to void

## 2024-12-20 NOTE — PROGRESS NOTES
Transferred pt to room 1115 in stable condition via wheelchair. Report given to Abbey DAVALOS. Infant transferred with mother in stable condition. Accompanied by , RN, and tech.

## 2024-12-21 VITALS
HEIGHT: 64 IN | SYSTOLIC BLOOD PRESSURE: 111 MMHG | HEART RATE: 81 BPM | TEMPERATURE: 98 F | DIASTOLIC BLOOD PRESSURE: 79 MMHG | RESPIRATION RATE: 12 BRPM | WEIGHT: 222 LBS | OXYGEN SATURATION: 100 % | BODY MASS INDEX: 37.9 KG/M2

## 2024-12-21 NOTE — PLAN OF CARE
Problem: POSTPARTUM  Goal: Long Term Goal:Experiences normal postpartum course  Description: INTERVENTIONS:  - Assess and monitor vital signs and lab values.  - Assess fundus and lochia.  - Provide ice/sitz baths for perineum discomfort.  - Monitor healing of incision/episiotomy/laceration, and assess for signs and symptoms of infection and hematoma.  - Assess bladder function and monitor for bladder distention.  - Provide/instruct/assist with pericare as needed.  - Provide VTE prophylaxis as needed.  - Monitor bowel function.  - Encourage ambulation and provide assistance as needed.  - Assess and monitor emotional status and provide social service/psych resources as needed.  - Utilize standard precautions and use personal protective equipment as indicated. Ensure aseptic care of all intravenous lines and invasive tubes/drains.  - Obtain immunization and exposure to communicable diseases history.  Outcome: Completed  Goal: Optimize infant feeding at the breast  Description: INTERVENTIONS:  - Initiate breast feeding within first hour after birth.   - Monitor effectiveness of current breast feeding efforts.  - Assess support systems available to mother/family.  - Identify cultural beliefs/practices regarding lactation, letdown techniques, maternal food preferences.  - Assess mother's knowledge and previous experience with breast feeding.  - Provide information as needed about early infant feeding cues (e.g., rooting, lip smacking, sucking fingers/hand) versus late cue of crying.  - Discuss/demonstrate breast feeding aids (e.g., infant sling, nursing footstool/pillows, and breast pumps).  - Encourage mother/other family members to express feelings/concerns, and actively listen.  - Educate father/SO about benefits of breast feeding and how to manage common lactation challenges.  - Recommend avoidance of specific medications or substances incompatible with breast feeding.  - Assess and monitor for signs of nipple  pain/trauma.  - Instruct and provide assistance with proper latch.  - Review techniques for milk expression (breast pumping) and storage of breast milk. Provide pumping equipment/supplies, instructions and assistance, as needed.  - Encourage rooming-in and breast feeding on demand.  - Encourage skin-to-skin contact.  - Provide LC support as needed.  - Assess for and manage engorgement.  - Provide breast feeding education handouts and information on community breast feeding support.   Outcome: Completed  Goal: Establishment of adequate milk supply with medication/procedure interruptions  Description: INTERVENTIONS:  - Review techniques for milk expression (breast pumping).   - Provide pumping equipment/supplies, instructions, and assistance until it is safe to breastfeed infant.  Outcome: Completed  Goal: Experiences normal breast weaning course  Description: INTERVENTIONS:  - Assess for and manage engorgement.  - Instruct on breast care.  - Provide comfort measures.  Outcome: Completed  Goal: Appropriate maternal -  bonding  Description: INTERVENTIONS:  - Assess caregiver- interactions.  - Assess caregiver's emotional status and coping mechanisms.  - Encourage caregiver to participate in  daily care.  - Assess support systems available to mother/family.  - Provide /case management support as needed.  Outcome: Completed     Problem: GENITOURINARY - ADULT  Goal: Absence of urinary retention  Description: INTERVENTIONS:  - Assess patient’s ability to void and empty bladder  - Monitor intake/output and perform bladder scan as needed  - Follow urinary retention protocol/standard of care  - Consider collaborating with pharmacy to review patient's medication profile  - Implement strategies to promote bladder emptying  Outcome: Completed

## 2024-12-21 NOTE — DISCHARGE SUMMARY
[facility]  Discharge Summary    Sabina Goyal Patient Status:  Inpatient    1990 MRN [id number]   Location [facility] 1SW-J Attending Sarah Morris MD   Hosp Day # 2 PCP Star Cleaning MD     Date of Admission: 2024    Date of Discharge: 2024    Admitting Diagnosis: Pregnancy (HCC) [Z34.90]    Discharge Diagnosis:   Patient Active Problem List   Diagnosis    NPDH (new persistent daily headache)    Pregnancy (HCC)       Reason for Admission: Pregnancy at term in labor    Procedures:      Hospital Course:Sabina Goyal 35 yo came in for contractions and was found to be in labor. She underwent vacuum assisted  with out problems. Please refer to delivery note for more details. She did well postpartum. Pain and bleeding were control. She was ambulating and tolerating diet. She received rhogamb. She had urinary retention for which the castellanos was reinserted and after removal she was able to void spontaneously.  She was afebrile for the entire admission. She was discharged home in stable condition on PPD#2    Labs:      Complications: none    Disposition: Home with instructions    DIET: General    Activity: slightly restricted    Discharge Condition: Stable    Follow Up: 6 weeks for PP visit, pt to call for appointment    Discharge Medications:   Current Discharge Medication List        CONTINUE these medications which have NOT CHANGED    Details   levothyroxine 25 MCG Oral Tab Take 1 tablet (25 mcg total) by mouth before breakfast.      Cholecalciferol (VITAMIN D) 50 MCG (2000 UT) Oral Cap Take 1 capsule (2,000 Units total) by mouth daily.      Ferrous Sulfate (IRON) 325 (65 Fe) MG Oral Tab Take 1 tablet by mouth daily.      Prenatal Multivit-Min-Fe-FA (PRENATAL OR) Take by mouth daily.           STOP taking these medications       methylPREDNISolone (MEDROL) 4 MG Oral Tablet Therapy Pack                Alisa Barnett MD  2024  11:21 AM

## 2024-12-21 NOTE — PROGRESS NOTES
[facility]  Post-Partum Vaginal Delivery Progress Note    Sabina Goyal Patient Status:  Inpatient    1990 MRN [id number]   Location [facility] 1SW-J Attending Sarah Morris MD   Hosp Day # 2 PCP Star Cleaning MD     SUBJECTIVE:    Postpartum Day 2 s/p vaginal delivery    The patient without complaints.  Pain is well controlled with current medications. Voiding normal.    Baby is both breast and bottle fed .      OBJECTIVE:    Vital signs in last 24 hours:  Temp:  [98.1 °F (36.7 °C)] 98.1 °F (36.7 °C)  Pulse:  [] 81  Resp:  [12-16] 12  BP: (111-117)/(79-81) 111/79        Data Reviewed:       ASSESSMENT:   Post Partum Day # 2 S/P Normal Spontaneous Vaginal Delivery    PLAN:  Continue present management.  Discharge instructions given.  Follow up six weeks. Call as needed.  Home today.      Alisa Barnett MD  2024  11:20 AM

## 2024-12-21 NOTE — PLAN OF CARE
Problem: POSTPARTUM  Goal: Long Term Goal:Experiences normal postpartum course  Description: INTERVENTIONS:  - Assess and monitor vital signs and lab values.  - Assess fundus and lochia.  - Provide ice/sitz baths for perineum discomfort.  - Monitor healing of incision/episiotomy/laceration, and assess for signs and symptoms of infection and hematoma.  - Assess bladder function and monitor for bladder distention.  - Provide/instruct/assist with pericare as needed.  - Provide VTE prophylaxis as needed.  - Monitor bowel function.  - Encourage ambulation and provide assistance as needed.  - Assess and monitor emotional status and provide social service/psych resources as needed.  - Utilize standard precautions and use personal protective equipment as indicated. Ensure aseptic care of all intravenous lines and invasive tubes/drains.  - Obtain immunization and exposure to communicable diseases history.  12/20/2024 1840 by Christal Contreras RN  Outcome: Progressing  12/20/2024 1840 by Christal Contreras, RN  Outcome: Progressing  Goal: Optimize infant feeding at the breast  Description: INTERVENTIONS:  - Initiate breast feeding within first hour after birth.   - Monitor effectiveness of current breast feeding efforts.  - Assess support systems available to mother/family.  - Identify cultural beliefs/practices regarding lactation, letdown techniques, maternal food preferences.  - Assess mother's knowledge and previous experience with breast feeding.  - Provide information as needed about early infant feeding cues (e.g., rooting, lip smacking, sucking fingers/hand) versus late cue of crying.  - Discuss/demonstrate breast feeding aids (e.g., infant sling, nursing footstool/pillows, and breast pumps).  - Encourage mother/other family members to express feelings/concerns, and actively listen.  - Educate father/SO about benefits of breast feeding and how to manage common lactation challenges.  - Recommend avoidance of  specific medications or substances incompatible with breast feeding.  - Assess and monitor for signs of nipple pain/trauma.  - Instruct and provide assistance with proper latch.  - Review techniques for milk expression (breast pumping) and storage of breast milk. Provide pumping equipment/supplies, instructions and assistance, as needed.  - Encourage rooming-in and breast feeding on demand.  - Encourage skin-to-skin contact.  - Provide LC support as needed.  - Assess for and manage engorgement.  - Provide breast feeding education handouts and information on community breast feeding support.   2024 by Christal Contreras RN  Outcome: Progressing  2024 by Christal Contreras RN  Outcome: Progressing  Goal: Establishment of adequate milk supply with medication/procedure interruptions  Description: INTERVENTIONS:  - Review techniques for milk expression (breast pumping).   - Provide pumping equipment/supplies, instructions, and assistance until it is safe to breastfeed infant.  2024 by Christal Contreras RN  Outcome: Progressing  2024 by Christal Contreras RN  Outcome: Progressing  Goal: Experiences normal breast weaning course  Description: INTERVENTIONS:  - Assess for and manage engorgement.  - Instruct on breast care.  - Provide comfort measures.  2024 by Christal Contreras RN  Outcome: Progressing  2024 by Christal Contreras RN  Outcome: Progressing  Goal: Appropriate maternal -  bonding  Description: INTERVENTIONS:  - Assess caregiver- interactions.  - Assess caregiver's emotional status and coping mechanisms.  - Encourage caregiver to participate in  daily care.  - Assess support systems available to mother/family.  - Provide /case management support as needed.  2024 by Christal Contreras RN  Outcome: Progressing  2024 by Christal Contreras RN  Outcome: Progressing     Problem:  GENITOURINARY - ADULT  Goal: Absence of urinary retention  Description: INTERVENTIONS:  - Assess patient’s ability to void and empty bladder  - Monitor intake/output and perform bladder scan as needed  - Follow urinary retention protocol/standard of care  - Consider collaborating with pharmacy to review patient's medication profile  - Implement strategies to promote bladder emptying  12/20/2024 1840 by Christal Contreras, RN  Outcome: Progressing  12/20/2024 1840 by Christal Contreras, RN  Outcome: Progressing

## 2024-12-21 NOTE — DISCHARGE INSTRUCTIONS
Post Vaginal Delivery Home Care Instructions       We hope you were pleased with your care at [facility].  We wish you the best outcome and overall experience with the delivery of your baby.  These instructions will help to minimize pain, limit the risk for an infection, and improve the likelihood of a successful recovery.    What to Expect:  Abdominal cramping after delivery especially if you are breastfeeding.   Vaginal bleeding for about 4-6 weeks that may be followed by a yellow or white discharge for a few more weeks.  Your period will resume in approximately 6-8 weeks, unless you are breastfeeding.    If you are bottle feeding, you may notice breast engorgement in about 3 days.  Your breast may be sore and hard. Please wear a tight fitted bra or sports bra for 24-36 hours to help prevent your breast from producing milk, and use ice packs to relive any discomfort.  If you are breastfeeding, nipple dryness is very common the first few days.    Constipation is common after having a baby.  Please increase fluid and fiber in your diet.      Over-The-Counter Medication  Non-prescription anti-inflammatory medications can also help to ease the pain.  You may take Aleve, Tylenol or Ibuprofen   Colace or Metamucil for Constipation  Lanolin for dry nipples  Tucks, Witch Hazel and Epifoam for Episiotomy discomfort.   Drink a full glass of water with oral medication and take as directed.    Wound Care  The following instructions will promote proper healing and help to prevent infection  Episiotomy Care: Sitz Bath for 15mins, 2-3 times a day,    Bathing/Showers  You may resume showers  No baths, swimming, hot tubs until your post-partum visit    Home Medication  Resume your home medications as instructed    Diet  Resume your normal diet    Activity  Refrain from vaginal intercourse, vaginal suppositories, tampon use or douches until after your post-partum visit.  No exercising for 4 weeks  You may climb stairs  minimally for the 1st week.    Do not do heavy housework for at least 2-3 weeks    Return to Work or School  You may return to work in approximately 6 weeks  Contact your obstetrician’s office, if you need a medical release.     Driving  Avoid driving if you are taking narcotics for pain relief.    Follow-up Appointment with Your Obstetrician  Call your obstetrician’s office today for an appointment in     weeks.    Verify your appointment date, day, time, and location.  At your 1st post-partum office visit:  Your progress will be evaluated, findings reviewed, and any additional concerns and instructions will be discussed.    Questions or Concerns  Call your obstetrician’s office if you experience the following:  Severe pain not controlled by pain medication  Too much pain when touched; yellowish, greenish, or bloody discharge, foul smelling vaginal discharge, cut site opens up  Heavy bleeding,problems with pain that does not go away or gets worse  Shortness of breath or sudden onset of chest pain  Fever of 100.4 F (38 C) or higher, chills, warmth around wound that will not heal  Redness, increased swelling or drainage from your incision  Crying and periods of sadness that prevents you from caring for yourself and your baby or you feel like harming yourself or baby.  Burning sensation during urination or inability to urinate or have bowel movements  Swelling, redness or abnormal warmth to your leg/calf  Swollen, hard, or painful breasts  You are not feeling better in 2 to 3 days, or are feeling increasingly worse  If your call is made after office hours, a physician will be available to help you.  There is always a provider covering our patients.    MEDICATIONS offered/used during your stay:    @ MOTRIN (Ibuprofin 600mg)   1 tab every 6 hours as needed for pain   Last taken at:   --> Next dose due at:       @ TYLENOL (Acetaminophen 500)   1-2 tabs, every 6 hours, as needed for increased pain.  Last taken at:   -->  Next dose due at:      @ COLACE (Docusate Sodium 100mg)  1 tab two times per day as needed for stool softening   (once in am/once in pm)  Last taken at:   --> Next dose due at:      @ SIMETHICONE (Mylicon 80mg) Chewable Tab   1 tab daily as needed for gas.  Last taken at:    @ DERMOPLAST (Pain Relieving Spray)   Use as needed for perineal discomfort    @ TUCKS (Witch-Gabbi Glycerin pads)   Use as needed for hemorrhoids and/or perineal discomfort    Please see your Parent-Infant instruction pamphlet in your white [place] folder for further reference/review/instructions.  Thank you for coming to [facility].  We hope you've enjoyed your experience here.

## 2024-12-23 ENCOUNTER — TELEPHONE (OUTPATIENT)
Dept: OBGYN UNIT | Facility: HOSPITAL | Age: 34
End: 2024-12-23

## (undated) DEVICE — CANISTER SAFETOUCH SYST DISP

## (undated) DEVICE — CURRETTE 8MM CVD

## (undated) DEVICE — PREMIUM WET SKIN PREP TRAY: Brand: MEDLINE INDUSTRIES, INC.

## (undated) DEVICE — SLEEVE KENDALL SCD EXPRESS MED

## (undated) DEVICE — GYN CDS: Brand: MEDLINE INDUSTRIES, INC.

## (undated) DEVICE — STERILE POLYISOPRENE POWDER-FREE SURGICAL GLOVES: Brand: PROTEXIS

## (undated) DEVICE — SOL NACL IRRIG 0.9% 1000ML BTL

## (undated) NOTE — LETTER
OUTSIDE TESTING RESULT REQUEST     IMPORTANT: FOR YOUR IMMEDIATE ATTENTION  Please FAX all test results listed below to: [phone]     Testing already done on or about: 23     * * * * If testing is NOT complete, arrange with patient A.S.A.P. * * * *      Patient Name: Geronimo Mccormack  Surgery Date: 2023  Medical Record: [id number]  CSN: [id number]  : 1990 - A: 29 y     Sex: female  Surgeon(s):  Laureano Traylor MD  Procedure: SUCTION DILATION AND CURETTAGE  WITH ULTRASOUND GUIDANCE AND TECH  Anesthesia Type: MAC     Surgeon: Laureano Traylor MD     The following Testing and Time Line are REQUIRED PER ANESTHESIA     CBC, Platelet; NO Differential within  90 days      Thank You,   Sent by: Jerome Au